# Patient Record
Sex: FEMALE | Race: WHITE | NOT HISPANIC OR LATINO
[De-identification: names, ages, dates, MRNs, and addresses within clinical notes are randomized per-mention and may not be internally consistent; named-entity substitution may affect disease eponyms.]

---

## 2022-05-17 ENCOUNTER — APPOINTMENT (OUTPATIENT)
Dept: DERMATOLOGY | Facility: CLINIC | Age: 22
End: 2022-05-17
Payer: COMMERCIAL

## 2022-05-17 PROCEDURE — 99204 OFFICE O/P NEW MOD 45 MIN: CPT

## 2022-09-29 ENCOUNTER — EMERGENCY (EMERGENCY)
Facility: HOSPITAL | Age: 22
LOS: 1 days | Discharge: HOME | End: 2022-09-29
Attending: STUDENT IN AN ORGANIZED HEALTH CARE EDUCATION/TRAINING PROGRAM
Payer: COMMERCIAL

## 2022-09-29 VITALS
RESPIRATION RATE: 18 BRPM | DIASTOLIC BLOOD PRESSURE: 69 MMHG | WEIGHT: 125 LBS | TEMPERATURE: 98 F | HEART RATE: 81 BPM | SYSTOLIC BLOOD PRESSURE: 118 MMHG | OXYGEN SATURATION: 98 %

## 2022-09-29 PROCEDURE — 96372 THER/PROPH/DIAG INJ SC/IM: CPT

## 2022-09-29 PROCEDURE — 99283 EMERGENCY DEPT VISIT LOW MDM: CPT

## 2022-09-29 PROCEDURE — 99284 EMERGENCY DEPT VISIT MOD MDM: CPT

## 2022-09-29 RX ORDER — DEXAMETHASONE 0.5 MG/5ML
10 ELIXIR ORAL ONCE
Refills: 0 | Status: COMPLETED | OUTPATIENT
Start: 2022-09-29 | End: 2022-09-29

## 2022-09-29 RX ORDER — LEVOCETIRIZINE DIHYDROCHLORIDE 0.5 MG/ML
1 SOLUTION ORAL
Qty: 14 | Refills: 0
Start: 2022-09-29 | End: 2022-10-12

## 2022-09-29 RX ORDER — MONTELUKAST 4 MG/1
1 TABLET, CHEWABLE ORAL
Qty: 14 | Refills: 0
Start: 2022-09-29 | End: 2022-10-12

## 2022-09-29 RX ADMIN — Medication 10 MILLIGRAM(S): at 08:05

## 2022-09-29 NOTE — ED PROVIDER NOTE - CARE PROVIDER_API CALL
Miracle Olguin)  Dermatology  [address] [facility], [address]  [place] [address]  Phone: [phone]  Fax: [phone]  Follow Up Time: 1-3 Days    Luigi Salmeron)  Medicine Pediatrics  [address] [place] [address]  Phone: [phone]  Fax: [phone]  Follow Up Time: 1-3 Days

## 2022-09-29 NOTE — ED ADULT TRIAGE NOTE - CHIEF COMPLAINT QUOTE
Pt presents to ED c/o pruritic rash to her extremities + neck since Thursday when she got a massage. States the rash became more prominent as of Sunday night. NKA.

## 2022-09-29 NOTE — ED PROVIDER NOTE - OBJECTIVE STATEMENT
Pt is a 20 yo F co rash.  Pt states that 8 d ago she had a massage. Pt states that the next day she broke out in a rash all over her body that is pruritic. Pt states that it became much worse 5 d ago. Pt went to  3 d ago and was started on prednisone without relief. Pt states that she take benadryl at night. no sob. no chest tightness. no other complaints.

## 2022-09-29 NOTE — ED ADULT NURSE REASSESSMENT NOTE - NS ED NURSE REASSESS COMMENT FT1
Pt arrives to ED c/o generalized rash since Thursday - already on Prednisone , not getting better . Pt seen and eval by provider , medicated as per MD orders. Discharge instructions provided and understood

## 2022-09-29 NOTE — ED PROVIDER NOTE - CLINICAL SUMMARY MEDICAL DECISION MAKING FREE TEXT BOX
Pt with urticaria likely from something used during massage. Pt given shot of decadron here and will start xyzal/singulair.  Pt informed of possibility of psychiatric symptoms with singulair including depression, anxiety, SI and instructed to stop right away and seek medical attention if this occurs.  Will also only give a short course of the singulair. Pt instructed to f/up with allergist and dermatologist. instructed to return for worsening rash, throat swelling, trouble breathing, or any other concerns.

## 2022-09-29 NOTE — ED PROVIDER NOTE - PHYSICAL EXAMINATION
Constitutional - well-developed.   Head - NCAT. Airway patent.   Eyes - PERRL.   CV - RRR.   Pulm - CTAB.   Neuro - A&Ox3. normal gait.   Skin -urticarial rash to trunk, arms, legs, and face.  MSK - normal ROM.

## 2022-09-29 NOTE — ED PROVIDER NOTE - PROVIDER TOKENS
PROVIDER:[TOKEN:[[id number]],FOLLOWUP:[1-3 Days]],PROVIDER:[TOKEN:[[id number]],FOLLOWUP:[1-3 Days]]

## 2022-09-29 NOTE — ED PROVIDER NOTE - NS ED ROS FT
No fever/chills   No photophobia/eye pain/changes in visio,   No ear pain/sore throat/dysphagia   No chest pain/palpitations  No SOB/cough/wheeze/stridor   No abdominal pain, No N/V/D  No dysuria/frequency/discharge   No neck/back pain,   + rash  No changes in neurological status/function.

## 2022-09-29 NOTE — ED PROVIDER NOTE - PATIENT PORTAL LINK FT
You can access the FollowMyHealth Patient Portal offered by [facility] by registering at the following website: [email or web address]. By joining [employer]’s FollowMyHealth portal, you will also be able to view your health information using other applications (apps) compatible with our system.

## 2022-10-04 ENCOUNTER — EMERGENCY (EMERGENCY)
Facility: HOSPITAL | Age: 22
LOS: 1 days | Discharge: HOME | End: 2022-10-04
Attending: EMERGENCY MEDICINE
Payer: COMMERCIAL

## 2022-10-04 VITALS
WEIGHT: 130.07 LBS | RESPIRATION RATE: 18 BRPM | TEMPERATURE: 98 F | OXYGEN SATURATION: 97 % | SYSTOLIC BLOOD PRESSURE: 120 MMHG | HEIGHT: 63 IN | HEART RATE: 82 BPM | DIASTOLIC BLOOD PRESSURE: 80 MMHG

## 2022-10-04 PROCEDURE — 96372 THER/PROPH/DIAG INJ SC/IM: CPT

## 2022-10-04 PROCEDURE — 99284 EMERGENCY DEPT VISIT MOD MDM: CPT

## 2022-10-04 RX ORDER — HYDROXYZINE HCL 10 MG
1 TABLET ORAL
Qty: 20 | Refills: 0
Start: 2022-10-04 | End: 2022-10-08

## 2022-10-04 RX ORDER — DEXAMETHASONE 0.5 MG/5ML
10 ELIXIR ORAL ONCE
Refills: 0 | Status: COMPLETED | OUTPATIENT
Start: 2022-10-04 | End: 2022-10-04

## 2022-10-04 RX ADMIN — Medication 10 MILLIGRAM(S): at 09:46

## 2022-10-04 NOTE — ED PROVIDER NOTE - NS ED ROS FT
Constitutional: no fever, no chills  Head: NC, AT   Eyes: no redness   ENMT: no nasal congestion/drainage, no sore throat   CV: no chest pain, no edema  Resp: no cough, no dyspnea  GI: no abdominal pain, no nausea, no vomiting, no diarrhea  : no dysuria, no hematuria   Skin: no lesions, + rashes   Neuro: no LOC, no headache, no sensory deficits, no weakness

## 2022-10-04 NOTE — ED ADULT NURSE NOTE - OBJECTIVE STATEMENT
a&ox4 c/o generalized itchy rash on entire body / limbs x 1 week. pt believes its from new laundry detergent. naeem resp distress/sob/wheezing.  oral mucosa/throat wnl, o2 sat wnl.   seen in ED last week for same; resolved and reoccurred

## 2022-10-04 NOTE — ED PROVIDER NOTE - OBJECTIVE STATEMENT
22 y/o F with no significant PMHx who presents to the ED c/o whole body burning/itchy rash. States that she was here on Sep 29th for similar and given meds (per EMR montelukast and Xyzal) which did not help. States that she was also given prednisone by urgent care when the rash started which also did not help. States that she saw a Dermatologist yesterday who is planning to start her on a higher dose prednisone but states that she has not started taking the medication yet. States that she was given a decadron shot here last time which helped for that day only. States that she can not identify a clear source of a new allergin. States that her mother recent changed her laundry detergent, but she rewashed all her cloths with a new detergent but the symptoms did not resolve. Denies any other complaints.

## 2022-10-04 NOTE — ED PROVIDER NOTE - PHYSICAL EXAMINATION
General: well appearing, NAD  Head: NC, AT  EENT: no scleral icterus  Cardiac: RRR, no apparent murmurs, no lower extremity edema  Respiratory: CTABL, no respiratory distress   Abdomen: soft, ND, NT, nonperitonitic  MSK/Vascular: full ROM, soft compartments, warm extremities  Skin: urticarial rash on trunk and extremities  Neuro: AAOx3, sensation to light touch intact  Psych: calm, cooperative

## 2022-10-04 NOTE — ED ADULT TRIAGE NOTE - CCCP TRG CHIEF CMPLNT
----- Message from Shanice Sandoval MD sent at 3/14/2019  9:56 PM CDT -----  These results need to [facility] to [facility].  Please call patient to find out which doctor she sees.  Dr. Willett may also know   rash

## 2022-10-04 NOTE — ED PROVIDER NOTE - ATTENDING CONTRIBUTION TO CARE
Reports itchy rash to torso and extremities for the past week.  Denies tongue swelling.  Denies difficulty swallowing.  Denies shortness of breath.  [facility] and was prescribed a prednisone taper (60 mg to 10 mg), finished 2 days ago.  Seen in the emergency department last week and was prescribed montelukast.  Went to  dermatology walk-in clinic yesterday and was prescribed prednisone, which patient has not yet  the Prescription.  Reports ongoing itch unrelieved with Benadryl.  Denies fever.  Denies prior dermatologic problems other than acne.  Physical examination: Nontoxic-appearing, no acute distress, urticarial rash to torso and extremities.  A/P urticaria: Continue prednisone as prescribed by dermatology, Atarax for itch and follow-up with allergy/immunology recommended Reports itchy rash to torso and extremities for the past week.  Denies tongue swelling.  Denies difficulty swallowing.  Denies shortness of breath.  [facility] and was prescribed a prednisone taper (60 mg to 10 mg), finished 2 days ago.  Seen in the emergency department last week and was prescribed montelukast.  Went to  dermatology walk-in clinic yesterday and was prescribed prednisone, which patient has not yet  the picked up the prescription.  Reports ongoing itch unrelieved with Benadryl.  Denies fever.  Denies prior dermatologic problems other than acne. Believes rash may be related to new laundry detergent. Physical examination: Nontoxic-appearing, no acute distress, urticarial rash to torso and extremities.  A/P urticaria: Continue prednisone as prescribed by dermatology, Atarax for itch and follow-up with allergy/immunology recommended

## 2022-10-04 NOTE — ED PROVIDER NOTE - NSFOLLOWUPINSTRUCTIONS_ED_ALL_ED_FT
-- Please follow-up with your doctor(s) within the next 3 days, but see medical sooner if your symptoms persist or worsen.  Please call tomorrow for an appointment.  If you cannot follow-up with your primary doctor please return to the ED for any urgent issues.  -- You were given a copy of your labs and imaging.  Please go-over these with your doctor(s).   -- If you have any worsening of symptoms or any other concerns please see your doctor or return to the ED immediately.  -- Please continue taking your home medications as directed.  Do not use alcohol when taking any medication (especially antibiotics, tylenol or other pain medication) unless you check with the doctor or pharmacist.    Rash    A rash is a change in the color of the skin. A rash can also change the way your skin feels. There are many different conditions and factors that can cause a rash, most of which are not dangerous. Make sure to follow up with your primary care physician or a dermatologist as instructed by your health care provider.    SEEK IMMEDIATE MEDICAL CARE IF YOU HAVE ANY OF THE FOLLOWING SYMPTOMS: fever, blisters, a rash inside your mouth, vaginal or anal pain, or altered mental status.

## 2022-10-04 NOTE — ED ADULT TRIAGE NOTE - CHIEF COMPLAINT QUOTE
pt c/o rash to whole body was here last week for the same, ran out of meds, rash getting worse  A&Ox3, resp wnl c/o burning itch

## 2022-10-04 NOTE — ED PROVIDER NOTE - CARE PROVIDERS DIRECT ADDRESSES
Hayder Schilder   MRN: [id number]    Department:  THE [facility] Emergency Department in [place]   Date of Visit:  8/10/2017           Disclosure     Insurance plans vary and the physician(s) referred by the ER may not be covered by your plan.  Please contact y If you have been prescribed any medication(s), please fill your prescription right away and begin taking the medication(s) as directed    If the emergency physician has read X-rays, these will be re-interpreted by a radiologist.  If there is a significant ,DirectAddress_Unknown

## 2022-10-04 NOTE — ED PROVIDER NOTE - CLINICAL SUMMARY MEDICAL DECISION MAKING FREE TEXT BOX
20 y/o F with no significant PMHx who presents to the ED c/o whole body burning/itchy rash. Will give decadron and reassess.

## 2022-10-04 NOTE — ED PROVIDER NOTE - CARE PROVIDER_API CALL
Luigi Salmeron)  Medicine Pediatrics  [address] [place] [address]  Phone: [phone]  Fax: [phone]  Follow Up Time:

## 2022-10-04 NOTE — ED PROVIDER NOTE - PATIENT PORTAL LINK FT
You can access the FollowMyHealth Patient Portal offered by [facility] by registering at the following website: [email or web address]. By joining [employer]’s FollowMyHealth portal, you will also be able to view your health information using other applications (apps) compatible with our system.

## 2022-10-04 NOTE — ED PROVIDER NOTE - PROGRESS NOTE DETAILS
Patient remains comfortable and in no acute distress. Given Decadron here, plan to d/c on course of hydroxyzine with Derm/Allergy f/u and return precautions. - Luigi

## 2022-11-28 PROBLEM — Z78.9 OTHER SPECIFIED HEALTH STATUS: Chronic | Status: ACTIVE | Noted: 2022-10-04

## 2022-11-30 ENCOUNTER — APPOINTMENT (OUTPATIENT)
Dept: DERMATOLOGY | Facility: CLINIC | Age: 22
End: 2022-11-30

## 2022-11-30 PROCEDURE — 99214 OFFICE O/P EST MOD 30 MIN: CPT

## 2023-01-31 ENCOUNTER — APPOINTMENT (OUTPATIENT)
Dept: DERMATOLOGY | Facility: CLINIC | Age: 23
End: 2023-01-31
Payer: COMMERCIAL

## 2023-01-31 PROCEDURE — 99214 OFFICE O/P EST MOD 30 MIN: CPT

## 2023-03-31 PROBLEM — Z00.00 ENCOUNTER FOR PREVENTIVE HEALTH EXAMINATION: Status: ACTIVE | Noted: 2023-03-31

## 2023-07-10 ENCOUNTER — APPOINTMENT (OUTPATIENT)
Dept: OBGYN | Facility: CLINIC | Age: 23
End: 2023-07-10

## 2023-08-01 ENCOUNTER — APPOINTMENT (OUTPATIENT)
Dept: DERMATOLOGY | Facility: CLINIC | Age: 23
End: 2023-08-01

## 2023-08-03 ENCOUNTER — APPOINTMENT (OUTPATIENT)
Dept: OBGYN | Facility: CLINIC | Age: 23
End: 2023-08-03
Payer: COMMERCIAL

## 2023-08-03 ENCOUNTER — NON-APPOINTMENT (OUTPATIENT)
Age: 23
End: 2023-08-03

## 2023-08-03 VITALS
DIASTOLIC BLOOD PRESSURE: 72 MMHG | HEIGHT: 64 IN | SYSTOLIC BLOOD PRESSURE: 113 MMHG | BODY MASS INDEX: 22.88 KG/M2 | HEART RATE: 81 BPM | WEIGHT: 134 LBS

## 2023-08-03 DIAGNOSIS — Z87.2 PERSONAL HISTORY OF DISEASES OF THE SKIN AND SUBCUTANEOUS TISSUE: ICD-10-CM

## 2023-08-03 DIAGNOSIS — Z01.419 ENCOUNTER FOR GYNECOLOGICAL EXAMINATION (GENERAL) (ROUTINE) W/OUT ABNORMAL FINDINGS: ICD-10-CM

## 2023-08-03 PROCEDURE — 99385 PREV VISIT NEW AGE 18-39: CPT

## 2023-08-03 NOTE — HISTORY OF PRESENT ILLNESS
[FreeTextEntry1] : 21 yo ,G0, here for av. She has no complaints today. Has h/o acne, just stopped ocp and spironolactone bc she doesnt like the way she feels on them.   She is  sa-will use condoms when active.  She denies family h/o gyn or colon cancers.

## 2023-08-07 LAB
C TRACH RRNA SPEC QL NAA+PROBE: NOT DETECTED
N GONORRHOEA RRNA SPEC QL NAA+PROBE: NOT DETECTED
SOURCE AMPLIFICATION: NORMAL

## 2023-08-24 ENCOUNTER — APPOINTMENT (OUTPATIENT)
Dept: DERMATOLOGY | Facility: CLINIC | Age: 23
End: 2023-08-24

## 2023-12-07 ENCOUNTER — APPOINTMENT (OUTPATIENT)
Dept: DERMATOLOGY | Facility: CLINIC | Age: 23
End: 2023-12-07
Payer: COMMERCIAL

## 2023-12-07 PROCEDURE — 99214 OFFICE O/P EST MOD 30 MIN: CPT

## 2024-01-04 ENCOUNTER — APPOINTMENT (OUTPATIENT)
Dept: DERMATOLOGY | Facility: CLINIC | Age: 24
End: 2024-01-04
Payer: COMMERCIAL

## 2024-01-04 PROCEDURE — 11901 INJECT SKIN LESIONS >7: CPT

## 2024-01-04 PROCEDURE — 99211 OFF/OP EST MAY X REQ PHY/QHP: CPT | Mod: 25

## 2024-04-09 ENCOUNTER — APPOINTMENT (OUTPATIENT)
Dept: DERMATOLOGY | Facility: CLINIC | Age: 24
End: 2024-04-09
Payer: COMMERCIAL

## 2024-04-09 PROCEDURE — 99214 OFFICE O/P EST MOD 30 MIN: CPT

## 2024-05-07 ENCOUNTER — APPOINTMENT (OUTPATIENT)
Dept: DERMATOLOGY | Facility: CLINIC | Age: 24
End: 2024-05-07

## 2025-01-27 ENCOUNTER — APPOINTMENT (OUTPATIENT)
Dept: INTERNAL MEDICINE | Facility: CLINIC | Age: 25
End: 2025-01-27
Payer: COMMERCIAL

## 2025-01-27 VITALS
SYSTOLIC BLOOD PRESSURE: 112 MMHG | WEIGHT: 140 LBS | DIASTOLIC BLOOD PRESSURE: 70 MMHG | OXYGEN SATURATION: 98 % | HEIGHT: 64 IN | BODY MASS INDEX: 23.9 KG/M2 | HEART RATE: 86 BPM

## 2025-01-27 VITALS
SYSTOLIC BLOOD PRESSURE: 122 MMHG | HEIGHT: 64 IN | OXYGEN SATURATION: 97 % | HEART RATE: 67 BPM | WEIGHT: 194 LBS | DIASTOLIC BLOOD PRESSURE: 72 MMHG | BODY MASS INDEX: 33.12 KG/M2

## 2025-01-27 DIAGNOSIS — Z81.1 FAMILY HISTORY OF ALCOHOL ABUSE AND DEPENDENCE: ICD-10-CM

## 2025-01-27 DIAGNOSIS — Z83.438 FAMILY HISTORY OF OTHER DISORDER OF LIPOPROTEIN METABOLISM AND OTHER LIPIDEMIA: ICD-10-CM

## 2025-01-27 DIAGNOSIS — R21 RASH AND OTHER NONSPECIFIC SKIN ERUPTION: ICD-10-CM

## 2025-01-27 DIAGNOSIS — Z82.5 FAMILY HISTORY OF ASTHMA AND OTHER CHRONIC LOWER RESPIRATORY DISEASES: ICD-10-CM

## 2025-01-27 DIAGNOSIS — Z80.0 FAMILY HISTORY OF MALIGNANT NEOPLASM OF DIGESTIVE ORGANS: ICD-10-CM

## 2025-01-27 DIAGNOSIS — B00.9 HERPESVIRAL INFECTION, UNSPECIFIED: ICD-10-CM

## 2025-01-27 DIAGNOSIS — Z00.00 ENCOUNTER FOR GENERAL ADULT MEDICAL EXAMINATION W/OUT ABNORMAL FINDINGS: ICD-10-CM

## 2025-01-27 DIAGNOSIS — J45.909 UNSPECIFIED ASTHMA, UNCOMPLICATED: ICD-10-CM

## 2025-01-27 DIAGNOSIS — K21.9 GASTRO-ESOPHAGEAL REFLUX DISEASE W/OUT ESOPHAGITIS: ICD-10-CM

## 2025-01-27 PROCEDURE — 99385 PREV VISIT NEW AGE 18-39: CPT

## 2025-01-27 RX ORDER — LEVONORGESTREL AND ETHINYL ESTRADIOL 0.1-0.02MG
0.1-2 KIT ORAL DAILY
Refills: 0 | Status: ACTIVE | COMMUNITY
Start: 2025-01-27

## 2025-01-27 RX ORDER — PANTOPRAZOLE 40 MG/1
40 TABLET, DELAYED RELEASE ORAL
Qty: 1 | Refills: 0 | Status: ACTIVE | COMMUNITY
Start: 2025-01-27 | End: 1900-01-01

## 2025-01-27 RX ORDER — VALACYCLOVIR 500 MG/1
500 TABLET, FILM COATED ORAL
Qty: 21 | Refills: 0 | Status: ACTIVE | COMMUNITY
Start: 2024-05-02

## 2025-01-27 RX ORDER — ALBUTEROL SULFATE 90 UG/1
108 (90 BASE) INHALANT RESPIRATORY (INHALATION)
Qty: 1 | Refills: 0 | Status: ACTIVE | COMMUNITY
Start: 2025-01-27 | End: 1900-01-01

## 2025-01-27 RX ORDER — SPIRONOLACTONE 50 MG/1
50 TABLET ORAL
Refills: 0 | Status: ACTIVE | COMMUNITY

## 2025-01-28 ENCOUNTER — TRANSCRIPTION ENCOUNTER (OUTPATIENT)
Age: 25
End: 2025-01-28

## 2025-01-29 LAB
25(OH)D3 SERPL-MCNC: 31.7 NG/ML
ALBUMIN SERPL ELPH-MCNC: 4.1 G/DL
ALP BLD-CCNC: 52 U/L
ALT SERPL-CCNC: 13 U/L
ANION GAP SERPL CALC-SCNC: 11 MMOL/L
AST SERPL-CCNC: 16 U/L
BASOPHILS # BLD AUTO: 0.05 K/UL
BASOPHILS NFR BLD AUTO: 0.7 %
BILIRUB SERPL-MCNC: 0.2 MG/DL
BUN SERPL-MCNC: 13 MG/DL
CALCIUM SERPL-MCNC: 9.3 MG/DL
CHLORIDE SERPL-SCNC: 105 MMOL/L
CHOLEST SERPL-MCNC: 188 MG/DL
CO2 SERPL-SCNC: 21 MMOL/L
CREAT SERPL-MCNC: 0.95 MG/DL
EGFR: 87 ML/MIN/1.73M2
EOSINOPHIL # BLD AUTO: 0.43 K/UL
EOSINOPHIL NFR BLD AUTO: 6.4 %
ESTIMATED AVERAGE GLUCOSE: 103 MG/DL
FOLATE SERPL-MCNC: 19.3 NG/ML
GLUCOSE SERPL-MCNC: 83 MG/DL
HBA1C MFR BLD HPLC: 5.2 %
HCT VFR BLD CALC: 36.5 %
HDLC SERPL-MCNC: 61 MG/DL
HGB BLD-MCNC: 12.1 G/DL
IMM GRANULOCYTES NFR BLD AUTO: 0.1 %
LDLC SERPL CALC-MCNC: 106 MG/DL
LYMPHOCYTES # BLD AUTO: 1.53 K/UL
LYMPHOCYTES NFR BLD AUTO: 22.8 %
MAN DIFF?: NORMAL
MCHC RBC-ENTMCNC: 29.4 PG
MCHC RBC-ENTMCNC: 33.2 G/DL
MCV RBC AUTO: 88.8 FL
MONOCYTES # BLD AUTO: 0.46 K/UL
MONOCYTES NFR BLD AUTO: 6.9 %
NEUTROPHILS # BLD AUTO: 4.23 K/UL
NEUTROPHILS NFR BLD AUTO: 63.1 %
NONHDLC SERPL-MCNC: 126 MG/DL
PLATELET # BLD AUTO: 242 K/UL
POTASSIUM SERPL-SCNC: 4.4 MMOL/L
PROT SERPL-MCNC: 6.6 G/DL
RBC # BLD: 4.11 M/UL
RBC # FLD: 12.5 %
SODIUM SERPL-SCNC: 136 MMOL/L
TRIGL SERPL-MCNC: 112 MG/DL
TSH SERPL-ACNC: 2.16 UIU/ML
VIT B12 SERPL-MCNC: 364 PG/ML
WBC # FLD AUTO: 6.71 K/UL

## 2025-01-31 RX ORDER — FLUTICASONE PROPIONATE AND SALMETEROL 100; 50 UG/1; UG/1
100-50 POWDER RESPIRATORY (INHALATION) TWICE DAILY
Qty: 1 | Refills: 0 | Status: ACTIVE | COMMUNITY
Start: 2025-01-27

## 2025-03-11 ENCOUNTER — APPOINTMENT (OUTPATIENT)
Dept: ORTHOPEDIC SURGERY | Facility: CLINIC | Age: 25
End: 2025-03-11

## 2025-03-11 ENCOUNTER — APPOINTMENT (OUTPATIENT)
Dept: ORTHOPEDIC SURGERY | Facility: CLINIC | Age: 25
End: 2025-03-11
Payer: COMMERCIAL

## 2025-03-11 VITALS — BODY MASS INDEX: 23.9 KG/M2 | WEIGHT: 140 LBS | HEIGHT: 64 IN

## 2025-03-11 DIAGNOSIS — M22.41 CHONDROMALACIA PATELLAE, RIGHT KNEE: ICD-10-CM

## 2025-03-11 PROCEDURE — 99203 OFFICE O/P NEW LOW 30 MIN: CPT

## 2025-03-11 PROCEDURE — 73562 X-RAY EXAM OF KNEE 3: CPT | Mod: RT

## 2025-03-11 RX ORDER — MELOXICAM 15 MG/1
15 TABLET ORAL
Qty: 30 | Refills: 0 | Status: ACTIVE | COMMUNITY
Start: 2025-03-11 | End: 1900-01-01

## 2025-04-11 ENCOUNTER — APPOINTMENT (OUTPATIENT)
Dept: ORTHOPEDIC SURGERY | Facility: CLINIC | Age: 25
End: 2025-04-11
Payer: COMMERCIAL

## 2025-04-11 VITALS
HEART RATE: 55 BPM | DIASTOLIC BLOOD PRESSURE: 64 MMHG | SYSTOLIC BLOOD PRESSURE: 99 MMHG | BODY MASS INDEX: 23.9 KG/M2 | WEIGHT: 140 LBS | HEIGHT: 64 IN

## 2025-04-11 PROCEDURE — 99203 OFFICE O/P NEW LOW 30 MIN: CPT

## 2025-06-06 ENCOUNTER — TRANSCRIPTION ENCOUNTER (OUTPATIENT)
Age: 25
End: 2025-06-06

## 2025-06-10 ENCOUNTER — APPOINTMENT (OUTPATIENT)
Dept: INTERNAL MEDICINE | Facility: CLINIC | Age: 25
End: 2025-06-10
Payer: COMMERCIAL

## 2025-06-10 ENCOUNTER — NON-APPOINTMENT (OUTPATIENT)
Age: 25
End: 2025-06-10

## 2025-06-10 VITALS
OXYGEN SATURATION: 97 % | HEIGHT: 64 IN | WEIGHT: 144 LBS | BODY MASS INDEX: 24.59 KG/M2 | DIASTOLIC BLOOD PRESSURE: 63 MMHG | SYSTOLIC BLOOD PRESSURE: 104 MMHG | HEART RATE: 57 BPM

## 2025-06-10 PROCEDURE — G2211 COMPLEX E/M VISIT ADD ON: CPT | Mod: NC

## 2025-06-10 PROCEDURE — 99214 OFFICE O/P EST MOD 30 MIN: CPT

## 2025-06-13 ENCOUNTER — RESULT REVIEW (OUTPATIENT)
Age: 25
End: 2025-06-13

## 2025-06-13 ENCOUNTER — OUTPATIENT (OUTPATIENT)
Dept: OUTPATIENT SERVICES | Facility: HOSPITAL | Age: 25
LOS: 1 days | End: 2025-06-13
Payer: COMMERCIAL

## 2025-06-13 ENCOUNTER — APPOINTMENT (OUTPATIENT)
Dept: RADIOLOGY | Facility: CLINIC | Age: 25
End: 2025-06-13

## 2025-06-13 ENCOUNTER — TRANSCRIPTION ENCOUNTER (OUTPATIENT)
Age: 25
End: 2025-06-13

## 2025-06-13 ENCOUNTER — OUTPATIENT (OUTPATIENT)
Dept: OUTPATIENT SERVICES | Facility: HOSPITAL | Age: 25
LOS: 1 days | End: 2025-06-13

## 2025-06-13 DIAGNOSIS — M25.561 PAIN IN RIGHT KNEE: ICD-10-CM

## 2025-06-13 DIAGNOSIS — M54.2 CERVICALGIA: ICD-10-CM

## 2025-06-13 PROCEDURE — 72110 X-RAY EXAM L-2 SPINE 4/>VWS: CPT | Mod: 26

## 2025-06-13 PROCEDURE — 72070 X-RAY EXAM THORAC SPINE 2VWS: CPT | Mod: 26

## 2025-06-13 PROCEDURE — 72070 X-RAY EXAM THORAC SPINE 2VWS: CPT

## 2025-06-13 PROCEDURE — 72050 X-RAY EXAM NECK SPINE 4/5VWS: CPT | Mod: 26

## 2025-06-13 PROCEDURE — 72050 X-RAY EXAM NECK SPINE 4/5VWS: CPT

## 2025-06-13 PROCEDURE — 72110 X-RAY EXAM L-2 SPINE 4/>VWS: CPT
